# Patient Record
(demographics unavailable — no encounter records)

---

## 2025-01-28 NOTE — PLAN
[FreeTextEntry1] : HPV/Pap performed today Advised regular exercise and diet for weight loss.  RTO 1 year for annual.

## 2025-01-28 NOTE — HISTORY OF PRESENT ILLNESS
[FreeTextEntry1] : 22 year old P1 LMP 1/6/25 presents for annual gyn visit.  Pt feels well and has no complaints. She has regular menses every 2 months. She denies intermenstrual bleeding, itching, burning, or abnormal discharge. Pt followed by neurosurgeon and endocrinologist for pituitary tumor. She was also diagnosed with hypothyroidism, on meds.  [PapSmeardate] : 8/2023 [TextBox_31] : ASC-US [No] : Patient does not have concerns regarding sex [Previously active] : previously active

## 2025-01-28 NOTE — PHYSICAL EXAM
[Chaperone Present] : A chaperone was present in the examining room during all aspects of the physical examination [[id number]] : A chaperone was present during the pelvic exam. [Appropriately responsive] : appropriately responsive [Alert] : alert [No Acute Distress] : no acute distress [No Lymphadenopathy] : no lymphadenopathy [Soft] : soft [Non-tender] : non-tender [Non-distended] : non-distended [No HSM] : No HSM [No Lesions] : no lesions [No Mass] : no mass [Oriented x3] : oriented x3 [Examination Of The Breasts] : a normal appearance [No Masses] : no breast masses were palpable [Labia Majora] : normal [Labia Minora] : normal [Normal] : normal [Uterine Adnexae] : normal

## 2025-06-23 NOTE — ASSESSMENT
[FreeTextEntry1] : 1) Folliculitis, mild exacerbation of chronic disease - Reviewed risks (as well as mitigation strategies for adverse drug events as applicable), benefits, and alternatives of therapy  - Start BPO 10% wash once daily to AA  - Start clindamycin 1% lotion to AA BID, SED  - Discussed recurrent/chronic nature of this condition

## 2025-06-23 NOTE — HISTORY OF PRESENT ILLNESS
[FreeTextEntry1] : rash [de-identified] : 21 y/o F presenting for a rash for 1 week on the trunk. Used triamcinolone with partial relief.